# Patient Record
(demographics unavailable — no encounter records)

---

## 2024-10-24 NOTE — IMAGING
[Facet arthropathy] : Facet arthropathy [Disc space narrowing] : Disc space narrowing [No spinal deformity, fracture, lytic lesion, or marked single level collapse] : No spinal deformity, fracture, lytic lesion, or marked single level collapse [No instability seen on flexion/extension] : No instability seen on flexion/extension [de-identified] :  C Spine Inspection: No defects or deformities Palpation: TTP over C7 spinous process & trapezial and rhomboid musculature ROM: diminished in all planes, pain with extension.  Strength: 5/5 b/l deltoid, biceps, triceps, wrist flexors, wrist extensors, abductors Neuro: Sensation I LT Negative Puentes's b/l Negative Spurling

## 2024-10-24 NOTE — ASSESSMENT
[FreeTextEntry1] : the c45 disc space seems almost distracted;  there is an osteophyte that was probably prior to the injury in contact with the other side of the disc space;  I think we'll find a distraction type in jury at C45 on the MRI;    81 y/o M with acute on chronic axial neck pain s/p fall on 9/14. XR: diffuse spondylosis, no clear/obvious fx. On exam TTP over C7 spinous process, ROM diminished, pain with extension, no weakness. in UE.   -

## 2024-10-24 NOTE — HISTORY OF PRESENT ILLNESS
[Neck] : neck [de-identified] : 10/24/24: 81 y/o M presenting for evaluation of neck pain that started on 9/14 after falling off bed. Pain in midline of neck and into traps, denies radicular complaints. Had neck issues in the past, did PT, helped temporarily; unable to receive TK's d/t severity of stenosis. Has C Spine MRI 2017. Pain has been worsening since fall, saw PCP and was referred here. Denies balance/gait issues, no dexterity/fine motors problems. Reports had RT shoulder surgery at age 16, since has been having some diffuse weakness in RUE.

## 2024-11-07 NOTE — DATA REVIEWED
[MRI] : MRI [Cervical Spine] : cervical spine [Report was reviewed and noted in the chart] : The report was reviewed and noted in the chart [FreeTextEntry1] : [facility] C Spine MRI 10/25/24 1. Diffuse spondylosis w/o significant foraminal stenosis. 2. Increased lesion in the R lobe of the thyroid.

## 2024-11-07 NOTE — IMAGING
[Facet arthropathy] : Facet arthropathy [Disc space narrowing] : Disc space narrowing [No spinal deformity, fracture, lytic lesion, or marked single level collapse] : No spinal deformity, fracture, lytic lesion, or marked single level collapse [No instability seen on flexion/extension] : No instability seen on flexion/extension [de-identified] :  C Spine Inspection: No defects or deformities Palpation: TTP over C7 spinous process & trapezial and rhomboid musculature ROM: diminished in all planes, pain with extension.  Strength: 5/5 b/l deltoid, biceps, triceps, wrist flexors, wrist extensors, abductors Neuro: Sensation I LT Negative Puentes's b/l Negative Spurling

## 2024-11-07 NOTE — HISTORY OF PRESENT ILLNESS
[Neck] : neck [de-identified] : 11/7/24: Follow up C Spine. MRI review.   10/24/24: 79 y/o M presenting for evaluation of neck pain that started on 9/14 after falling off bed. Pain in midline of neck and into traps, denies radicular complaints. Had neck issues in the past, did PT, helped temporarily; unable to receive TK's d/t severity of stenosis. Has C Spine MRI 2017. Pain has been worsening since fall, saw PCP and was referred here. Denies balance/gait issues, no dexterity/fine motors problems. Reports had RT shoulder surgery at age 16, since has been having some diffuse weakness in RUE.

## 2024-11-07 NOTE — ASSESSMENT
[FreeTextEntry1] : 81 y/o M with acute on chronic axial neck pain s/p fall on 9/14. MRI: diffuse spondylosis, no significant foraminal stenosis.   - Referred to pain mgmt  - F/up as sxs dictate.

## 2024-11-20 NOTE — HISTORY OF PRESENT ILLNESS
[FreeTextEntry1] : 80 year M with PMHx significant for presents for initial evaluation regarding their neck pain.   Current treatment:   Location: Quality: Exacerbating Factors: Alleviating Factors: Numbness/tingling: Weakness:   Bowel/bladder dysfunction: Denies   Prior injections:   Prior Treatments:   Pertinent Surgical History:   Anticoagulation:     Patient denies current infection, fevers, or chills. Physician Disclaimer: I have personally reviewed and confirmed all HPI data with the patient.

## 2024-11-20 NOTE — HISTORY OF PRESENT ILLNESS
[FreeTextEntry1] : 80 year M with PMHx significant for presents for initial evaluation regarding their neck pain.   Current treatment:   Location: Quality: Exacerbating Factors: Alleviating Factors: Numbness/tingling: Weakness:   Bowel/bladder dysfunction: Denies   Prior injections:   Prior Treatments:   Pertinent Surgical History:   Anticoagulation:     Patient denies current infection, fevers, or chills. Physician Disclaimer: I have personally reviewed and confirmed all HPI data with the patient.                                    Alert-The patient is alert, awake and responds to voice. The patient is oriented to time, place, and person. The triage nurse is able to obtain subjective information.

## 2024-11-20 NOTE — ASSESSMENT
[FreeTextEntry1] : A discussion regarding available pain management treatment options occurred with the patient.  These included interventional, rehabilitative, pharmacological, and alternative modalities. We will proceed with the following:   Interventional treatment options: - Proceed with _ with fluoroscopic guidance - If inadequate relief would likely consider _ - Hold _ for _ days after obtaining appropriate medical clearance prior to planned injection - None indicated at present time  - see additional instructions below      Rehabilitative options: - initiate trial of physical therapy - continue physical therapy - participation in active HEP was discussed and instructions provided   Medication based treatment options: - initiate trial of _ - continue _   Complementary treatment options: - Weight management and lifestyle modifications discussed    Additional treatment recommendations as follows: - patient to follow up with_

## 2024-11-20 NOTE — PHYSICAL EXAM
[de-identified] : Constitutional:  - No acute distress  - Well developed; well nourished    Neurological:  - normal mood and affect  - alert and oriented x 3    Cardiovascular:  - grossly normal  General: Appears well nourished and well developed, no acute distress Musculoskeletal: Gait is non-antalgic, patient is ambulating without assistance Cervical Spine Exam:                      Inspection:    erythema (-)   ecchymosis (-)    rashes (-)                         Palpation: Cervical paraspinal tenderness: R (-); L(-)  Upper trapezius tenderness: R (-); L (-)  Rhomboids tenderness: R (-); L (-)                      ROM:     Full range of motion with mild stiffness                          Strength Testin/5 in the bilateral upper extremities                      Reflexes: 2/2 in the bilateral upper extremities                      Sensation: Sensation to light touch grossly intact in the bilateral upper extremities                      Special Tests:  Spurling Test: R (-); L (-)  Facet load test: R (-); L (-), Hoffmans : R (-), L (-)

## 2024-11-20 NOTE — DATA REVIEWED
[FreeTextEntry1] :  MRI images personally reviewed and reviewed with patient. Cervical Spine MRI OC (10/25/2024) 1. Multilevel degenerative disc and joint disease, most marked at C5-C6 and C6-C7 with likely impingement of bilateral exiting C6 and C7 nerve roots. 2. Interval increased 3.7 x 5.3 x 10.3 cm (AP x T x CC) lesion in the right lobe of the thyroid, which appears hypointense on T1 weighted sequence and heterogenous predominantly T2 hyperintense on T2 weighted sequence. Recommend further evaluation with sonography as clinically indicated.

## 2024-11-20 NOTE — PHYSICAL EXAM
[de-identified] : Constitutional:  - No acute distress  - Well developed; well nourished    Neurological:  - normal mood and affect  - alert and oriented x 3    Cardiovascular:  - grossly normal  General: Appears well nourished and well developed, no acute distress Musculoskeletal: Gait is non-antalgic, patient is ambulating without assistance Cervical Spine Exam:                      Inspection:    erythema (-)   ecchymosis (-)    rashes (-)                         Palpation: Cervical paraspinal tenderness: R (-); L(-)  Upper trapezius tenderness: R (-); L (-)  Rhomboids tenderness: R (-); L (-)                      ROM:     Full range of motion with mild stiffness                          Strength Testin/5 in the bilateral upper extremities                      Reflexes: 2/2 in the bilateral upper extremities                      Sensation: Sensation to light touch grossly intact in the bilateral upper extremities                      Special Tests:  Spurling Test: R (-); L (-)  Facet load test: R (-); L (-), Hoffmans : R (-), L (-)

## 2024-11-20 NOTE — PHYSICAL EXAM
[de-identified] : Constitutional:  - No acute distress  - Well developed; well nourished    Neurological:  - normal mood and affect  - alert and oriented x 3    Cardiovascular:  - grossly normal  General: Appears well nourished and well developed, no acute distress Musculoskeletal: Gait is non-antalgic, patient is ambulating without assistance Cervical Spine Exam:                      Inspection:    erythema (-)   ecchymosis (-)    rashes (-)                         Palpation: Cervical paraspinal tenderness: R (-); L(-)  Upper trapezius tenderness: R (-); L (-)  Rhomboids tenderness: R (-); L (-)                      ROM:     Full range of motion with mild stiffness                          Strength Testin/5 in the bilateral upper extremities                      Reflexes: 2/2 in the bilateral upper extremities                      Sensation: Sensation to light touch grossly intact in the bilateral upper extremities                      Special Tests:  Spurling Test: R (-); L (-)  Facet load test: R (-); L (-), Hoffmans : R (-), L (-)

## 2024-12-16 NOTE — PROCEDURE
[FreeTextEntry3] : Date of Service: 12/12/2024     Account: [id number]    Patient: CIRILO PARRY     YOB: 1944    Age: 80 year    Surgeon:      Walter Enriquez DO    Assistant:    None    Pre-Operative Diagnosis:         Spondylosis of Cervical Region without Myelopathy or Radiculopathy        Post Operative Diagnosis:       Spondylosis of Cervical Region without Myelopathy or Radiculopathy        Procedure:             Right C3,4,5,6 MBB under fluoroscopic guidance    Anesthesia:    None   Procedure Details: Patient was seen in the preoperative area and a detailed discussion about the risks, benefits and alternatives was carried out.  All related questions were satisfactorily answered and an informed consent was signed.  Procedure site was marked and patient was taken to the fluoroscopy suite and placed in the prone position on the procedure table.  A spine positioning device was utilized for adequate position.  Monitors were applied and vital signs were recorded.  Anesthesia was carried out as mentioned above. A timeout was performed with all essential staff present and the site and side were verified.     Posterior aspect of neck and upper back were prepped with Chloraprep  in a circumferential manner and draped in sterile aseptic fashion. The cervical spine was examined with fluoroscopy. AP and lateral views were used throughout the procedure. Lateral fluoroscopic images of cervical spine were obtained, and the lateral mass of C2 was identified. Parallax was eliminated with lateral rotation and cephalad or caudad angulation of the x-ray beam and cervical vertebral bodies were marked. The fluoroscopy beam was turned AP with slight caudad orientation. The skin entry sites were confirmed and marked on the skin. Each site was then anesthetized with 0.25 ml of 1% lidocaine  using a 25 gauge needle.    C3-C4 facet joint: C3 and C4 medial branches C4-C5 facet joint: C4 and C5 medial branches C5-C6 facet joint: C5 and C6 medial branches  The target areas for respective medial branches/dorsal ramus are:  Right C3 medial branch: Centroid of the articular pillar of left C3 vertebral body Right C4 medial branch: Centroid of the articular pillar of left C4 vertebral body Right C5 medial branch: Centroid of the articular pillar of left C5 vertebral body Right C6 medial branch: Centroid of the articular pillar of left C6 vertebral body  Using a gun barrel technique and intermittent fluoroscopic views, 25G spinal needles were placed at each respective level. A firm endpoint could be appreciated as the needle brushed past the bone. Correct needle position confirmed with multiple fluoroscopy views. At each level, 0.3-0.4 ml of 0.5% Bupivacaine  was injected at each level slowly. All needles were removed. Patient tolerated procedure well.  Vital signs remained normal throughout the procedure. There were no immediate complications from the performed procedure. Band aid(s) applied to injection site(s).  The patient was instructed to apply ice over the injection sites for twenty minutes every two hours for the next 24 hours.    Disposition:       1. The patient was advised to F/U in 1-2 weeks to assess the response to the injection.        2. They were advised to keep a pain diary to report the results of the diagnostic block at their FUV.       3. The patient was also instructed to contact me immediately if there were any concerns related to the procedure performed.

## 2024-12-16 NOTE — PROCEDURE
[FreeTextEntry3] : Date of Service: 12/12/2024     Account: [id number]    Patient: CIRILO PARRY     YOB: 1944    Age: 80 year    Surgeon:      Walter Enriquez DO    Assistant:    None    Pre-Operative Diagnosis:         Spondylosis of Cervical Region without Myelopathy or Radiculopathy        Post Operative Diagnosis:       Spondylosis of Cervical Region without Myelopathy or Radiculopathy        Procedure:             Right C3,4,5,6 MBB under fluoroscopic guidance    Anesthesia:    None   Procedure Details: Patient was seen in the preoperative area and a detailed discussion about the risks, benefits and alternatives was carried out.  All related questions were satisfactorily answered and an informed consent was signed.  Procedure site was marked and patient was taken to the fluoroscopy suite and placed in the prone position on the procedure table.  A spine positioning device was utilized for adequate position.  Monitors were applied and vital signs were recorded.  Anesthesia was carried out as mentioned above. A timeout was performed with all essential staff present and the site and side were verified.     Posterior aspect of neck and upper back were prepped with Chloraprep  in a circumferential manner and draped in sterile aseptic fashion. The cervical spine was examined with fluoroscopy. AP and lateral views were used throughout the procedure. Lateral fluoroscopic images of cervical spine were obtained, and the lateral mass of C2 was identified. Parallax was eliminated with lateral rotation and cephalad or caudad angulation of the x-ray beam and cervical vertebral bodies were marked. The fluoroscopy beam was turned AP with slight caudad orientation. The skin entry sites were confirmed and marked on the skin. Each site was then anesthetized with 0.25 ml of 1% lidocaine  using a 25 gauge needle.    C3-C4 facet joint: C3 and C4 medial branches C4-C5 facet joint: C4 and C5 medial branches C5-C6 facet joint: C5 and C6 medial branches  The target areas for respective medial branches/dorsal ramus are:  Right C3 medial branch: Centroid of the articular pillar of left C3 vertebral body Right C4 medial branch: Centroid of the articular pillar of left C4 vertebral body Right C5 medial branch: Centroid of the articular pillar of left C5 vertebral body Right C6 medial branch: Centroid of the articular pillar of left C6 vertebral body  Using a gun barrel technique and intermittent fluoroscopic views, 25G spinal needles were placed at each respective level. A firm endpoint could be appreciated as the needle brushed past the bone. Correct needle position confirmed with multiple fluoroscopy views. At each level, 0.3-0.4 ml of 0.5% Bupivacaine  was injected at each level slowly. All needles were removed. Patient tolerated procedure well.  Vital signs remained normal throughout the procedure. There were no immediate complications from the performed procedure. Band aid(s) applied to injection site(s).  The patient was instructed to apply ice over the injection sites for twenty minutes every two hours for the next 24 hours.    Disposition:       1. The patient was advised to F/U in 1-2 weeks to assess the response to the injection.        2. They were advised to keep a pain diary to report the results of the diagnostic block at their FUV.       3. The patient was also instructed to contact me immediately if there were any concerns related to the procedure performed.

## 2025-01-13 NOTE — HISTORY OF PRESENT ILLNESS
[Neck] : neck [Gradual] : gradual [7] : 7 [6] : 6 [Tightness] : tightness [Household chores] : household chores [Sleep] : sleep [Rest] : rest [Meds] : meds [Heat] : heat [] : no [FreeTextEntry1] : RT SHOULDER BLADE  [FreeTextEntry3] : 09/2024 [FreeTextEntry5] : BUMP TO A DRESSER BUMP HIMS SELF AND HAD A FALL  [FreeTextEntry6] : NUMBNESS  [FreeTextEntry7] : RT SHOULDER BLADE  [de-identified] : LOOKING UP  [de-identified] : MAN PEREZ [de-identified] : 10/25/95625 MRI CSPINE

## 2025-01-13 NOTE — ASSESSMENT
[FreeTextEntry1] : repeat mbbs 80 year M with PMHx significant for CAD s/p CABG presents with pain in the R>L neck  A discussion regarding available pain management treatment options occurred with the patient.  These included interventional, rehabilitative, pharmacological, and alternative modalities. We will proceed with the following:   Interventional treatment options: - Proceed with R C3,4,5,6 with fluoroscopic guidance, RFA if successful - If inadequate relief would likely consider OLIVER - see additional instructions below      Rehabilitative options: - participation in active HEP was discussed and instructions provided   Medication based treatment options: - continue Tylenol PRN   Complementary treatment options: - Weight management and lifestyle modifications discussed    Additional treatment recommendations as follows: - patient to follow up with Dr. Mitchell - patient to follow up with PCP about thryoid lesion  Patient is presenting with acute/sub-acute pain with impairment in ADLs and functionality. The pain has not responded to conservative care including NSAID therapy, OTC analgesics and/or physical therapy.  The risks, benefits and alternatives of the proposed procedure were explained in detail with the patient. The risks outlined include but are not limited to infection, bleeding, post- dural puncture headache, nerve injury, a temporary increase in pain, failure to resolve symptoms, need for future interventions, allergic reaction, and possible elevation of blood sugar in diabetics if using corticosteroid.  All questions were answered to patient's apparent satisfaction, and he/she verbalized an understanding.

## 2025-01-13 NOTE — PHYSICAL EXAM
[de-identified] : General: Appears well nourished and well developed, no acute distress Musculoskeletal: Gait is non-antalgic, patient is ambulating without assistance Cervical Spine Exam:                      Inspection:    erythema (-)   ecchymosis (-)    rashes (-)                         Palpation: Cervical paraspinal tenderness: R (-); L(-)  Upper trapezius tenderness: R (-); L (-)  Rhomboids tenderness: R (-); L (-)                      ROM:     Full range of motion with mild stiffness                          Strength Testin/5 in the bilateral upper extremities                      Reflexes: 1/2 in the bilateral upper extremities                      Sensation: Sensation to light touch grossly intact in the bilateral upper extremities                      Special Tests:  Spurling Test: R (-); L (-)  Facet load test: R (+); L (+), Hoffmans : R (-), L (-)

## 2025-01-13 NOTE — DATA REVIEWED
[FreeTextEntry1] :  MRI images personally reviewed and reviewed with patient. Cervical Spine MRI OC (10/25/2024) 1. Multilevel degenerative disc and joint disease, most marked at C5-C6 and C6-C7 with likely impingement of bilateral exiting C6 and C7 nerve roots. 2. Interval increased 3.7 x 5.3 x 10.3 cm (AP x T x CC) lesion in the right lobe of the thyroid, which appears hypointense on T1 weighted sequence and heterogenous predominantly T2 hyperintense on T2 weighted sequence. Recommend further evaluation with sonography as clinically indicated. (patient notified to follow up with PCP about this)

## 2025-01-15 NOTE — HISTORY OF PRESENT ILLNESS
[Neck] : neck [Gradual] : gradual [7] : 7 [Tightness] : tightness [Household chores] : household chores [Sleep] : sleep [Rest] : rest [Meds] : meds [Heat] : heat [6] : 6 [Radiating] : radiating [Intermittent] : intermittent [Retired] : Work status: retired [] : Patient is currently injured and not playing sports: no [FreeTextEntry1] : RT SHOULDER BLADE  [FreeTextEntry3] : 09/2024 [FreeTextEntry5] : BUMP TO A DRESSER BUMP HIMS SELF AND HAD A FALL  [FreeTextEntry6] : NUMBNESS ,stiffnes  [FreeTextEntry7] : RT SHOULDER BLADE  [de-identified] : LOOKING UP ,turning head  [de-identified] : MAN PEREZ [de-identified] : 10/25/16736 MRI CSPINE

## 2025-01-15 NOTE — ASSESSMENT
[FreeTextEntry1] : 80 year M with PMHx significant for CAD s/p CABG presents with pain in the R>L neck  Cervical spondylosis, >80% relief with first set. Will pursue second set.  A discussion regarding available pain management treatment options occurred with the patient.  These included interventional, rehabilitative, pharmacological, and alternative modalities. We will proceed with the following:   Interventional treatment options: - Proceed with 2nd R C3,4,5,6 with fluoroscopic guidance, RFA if successful - If inadequate relief would likely consider OLIVER - see additional instructions below      Rehabilitative options: - participation in active HEP was discussed and instructions provided   Medication based treatment options: - continue Tylenol PRN   Complementary treatment options: - Weight management and lifestyle modifications discussed    Additional treatment recommendations as follows: - patient to follow up with Dr. Mitchell - patient to follow up with PCP about thyroid lesion (patient aware and it is being monitored)  Patient is presenting with acute/sub-acute pain with impairment in ADLs and functionality. The pain has not responded to conservative care including NSAID therapy, OTC analgesics and/or physical therapy.  The risks, benefits and alternatives of the proposed procedure were explained in detail with the patient. The risks outlined include but are not limited to infection, bleeding, post- dural puncture headache, nerve injury, a temporary increase in pain, failure to resolve symptoms, need for future interventions, allergic reaction, and possible elevation of blood sugar in diabetics if using corticosteroid.  All questions were answered to patient's apparent satisfaction, and he/she verbalized an understanding.

## 2025-01-15 NOTE — HISTORY OF PRESENT ILLNESS
[Neck] : neck [Gradual] : gradual [7] : 7 [Tightness] : tightness [Household chores] : household chores [Sleep] : sleep [Rest] : rest [Meds] : meds [Heat] : heat [6] : 6 [Radiating] : radiating [Intermittent] : intermittent [Retired] : Work status: retired [] : Patient is currently injured and not playing sports: no [FreeTextEntry1] : RT SHOULDER BLADE  [FreeTextEntry3] : 09/2024 [FreeTextEntry5] : BUMP TO A DRESSER BUMP HIMS SELF AND HAD A FALL  [FreeTextEntry6] : NUMBNESS ,stiffnes  [FreeTextEntry7] : RT SHOULDER BLADE  [de-identified] : LOOKING UP ,turning head  [de-identified] : MAN PEREZ [de-identified] : 10/25/57529 MRI CSPINE

## 2025-01-15 NOTE — PHYSICAL EXAM
[de-identified] : General: Appears well nourished and well developed, no acute distress Musculoskeletal: Gait is non-antalgic, patient is ambulating without assistance Cervical Spine Exam:                      Inspection:    erythema (-)   ecchymosis (-)    rashes (-)                         Palpation: Cervical paraspinal tenderness: R (-); L(-)  Upper trapezius tenderness: R (-); L (-)  Rhomboids tenderness: R (-); L (-)                      ROM:     Full range of motion with mild stiffness                          Strength Testin/5 in the bilateral upper extremities                      Reflexes: 1/2 in the bilateral upper extremities                      Sensation: Sensation to light touch grossly intact in the bilateral upper extremities                      Special Tests:  Spurling Test: R (-); L (-)  Facet load test: R (+); L (+), Hoffmans : R (-), L (-)

## 2025-01-15 NOTE — PHYSICAL EXAM
[de-identified] : General: Appears well nourished and well developed, no acute distress Musculoskeletal: Gait is non-antalgic, patient is ambulating without assistance Cervical Spine Exam:                      Inspection:    erythema (-)   ecchymosis (-)    rashes (-)                         Palpation: Cervical paraspinal tenderness: R (-); L(-)  Upper trapezius tenderness: R (-); L (-)  Rhomboids tenderness: R (-); L (-)                      ROM:     Full range of motion with mild stiffness                          Strength Testin/5 in the bilateral upper extremities                      Reflexes: 1/2 in the bilateral upper extremities                      Sensation: Sensation to light touch grossly intact in the bilateral upper extremities                      Special Tests:  Spurling Test: R (-); L (-)  Facet load test: R (+); L (+), Hoffmans : R (-), L (-)

## 2025-03-05 NOTE — PROCEDURE
[FreeTextEntry3] : Date of Service: 03/05/2025     Account: [id number]    Patient: CIRILO PARRY     YOB: 1944    Age: 80 year    Surgeon:      Walter Enriquez DO    Assistant:    None    Pre-Operative Diagnosis:         Spondylosis of Cervical Region without Myelopathy or Radiculopathy        Post Operative Diagnosis:       Spondylosis of Cervical Region without Myelopathy or Radiculopathy        Procedure:             C3-4, C4-5, C5-6, facet block under fluoroscopic guidance    Anesthesia:  MAC     Procedure Details: Patient was seen in the preoperative area and a detailed discussion about the risks, benefits and alternatives was carried out.  All related questions were satisfactorily answered and an informed consent was signed.  Procedure site was marked and patient was taken to the fluoroscopy suite and placed in the prone position on the procedure table.  A spine positioning device was utilized for adequate position.  Monitors were applied and vital signs were recorded.  Anesthesia was carried out as mentioned above. A timeout was performed with all essential staff present and the site and side were verified.     Posterior aspect of neck and upper back were prepped with Chloraprep  in a circumferential manner and draped in sterile aseptic fashion. The cervical spine was examined with fluoroscopy. AP and lateral views were used throughout the procedure. Lateral fluoroscopic images of cervical spine were obtained, and the lateral mass of C2 was identified. Parallax was eliminated with lateral rotation and cephalad or caudad angulation of the x-ray beam and cervical vertebral bodies were marked. The fluoroscopy beam was turned AP with slight caudad orientation. The skin entry sites were confirmed and marked on the skin. Each site was then anesthetized with 0.25 ml of 1% lidocaine  using a 25 gauge needle.    C3-C4 facet joint: C3 and C4 medial branches C4-C5 facet joint: C4 and C5 medial branches C5-C6 facet joint: C5 and C6 medial branches  The target areas for respective medial branches/dorsal ramus are:  Right C3 medial branch: Centroid of the articular pillar of left C3 vertebral body Right C4 medial branch: Centroid of the articular pillar of left C4 vertebral body Right C5 medial branch: Centroid of the articular pillar of left C5 vertebral body Right C6 medial branch: Centroid of the articular pillar of left C6 vertebral body  Using a gun barrel technique and intermittent fluoroscopic views, 25G spinal needles were placed at each respective level. A firm endpoint could be appreciated as the needle brushed past the bone. Correct needle position confirmed with multiple fluoroscopy views. At each level, 0.3-0.4 ml of 0.5% Bupivacaine was injected at each level slowly. All needles were removed. Patient tolerated procedure well.  Vital signs remained normal throughout the procedure. There were no immediate complications from the performed procedure. Band aid(s) applied to injection site(s).  The patient was instructed to apply ice over the injection sites for twenty minutes every two hours for the next 24 hours.    Disposition:       1. The patient was advised to F/U in 1-2 weeks to assess the response to the injection.        2. They were advised to keep a pain diary to report the results of the diagnostic block at their [facility].       3. The patient was also instructed to contact me immediately if there were any concerns related to the procedure performed.

## 2025-03-28 NOTE — PHYSICAL EXAM
[de-identified] : General: Appears well nourished and well developed, no acute distress Musculoskeletal: Gait is non-antalgic, patient is ambulating without assistance Cervical Spine Exam:                      Inspection:    erythema (-)   ecchymosis (-)    rashes (-)                         Palpation: Cervical paraspinal tenderness: R (-); L(-)  Upper trapezius tenderness: R (-); L (-)  Rhomboids tenderness: R (-); L (-)                      ROM:     Full range of motion with mild stiffness                          Strength Testin/5 in the bilateral upper extremities                      Reflexes: 1/2 in the bilateral upper extremities                      Sensation: Sensation to light touch grossly intact in the bilateral upper extremities                      Special Tests:  Spurling Test: R (-); L (-)  Facet load test: R (+); L (+), Hoffmans : R (-), L (-)

## 2025-03-28 NOTE — ASSESSMENT
[FreeTextEntry1] : RFA?  80 year M with PMHx significant for CAD s/p CABG presents with pain in the R>L neck  Cervical spondylosis, >80% relief with first set. Will pursue second set.  A discussion regarding available pain management treatment options occurred with the patient.  These included interventional, rehabilitative, pharmacological, and alternative modalities. We will proceed with the following:   Interventional treatment options: - Proceed with 2nd R C3,4,5,6 with fluoroscopic guidance, RFA if successful - If inadequate relief would likely consider OLIVER - see additional instructions below      Rehabilitative options: - participation in active HEP was discussed and instructions provided   Medication based treatment options: - continue Tylenol PRN   Complementary treatment options: - Weight management and lifestyle modifications discussed    Additional treatment recommendations as follows: - patient to follow up with Dr. Mitchell - patient to follow up with PCP about thyroid lesion (patient aware and it is being monitored)  Patient is presenting with acute/sub-acute pain with impairment in ADLs and functionality. The pain has not responded to conservative care including NSAID therapy, OTC analgesics and/or physical therapy.  The risks, benefits and alternatives of the proposed procedure were explained in detail with the patient. The risks outlined include but are not limited to infection, bleeding, post- dural puncture headache, nerve injury, a temporary increase in pain, failure to resolve symptoms, need for future interventions, allergic reaction, and possible elevation of blood sugar in diabetics if using corticosteroid.  All questions were answered to patient's apparent satisfaction, and he/she verbalized an understanding.

## 2025-03-28 NOTE — HISTORY OF PRESENT ILLNESS
[Neck] : neck [Gradual] : gradual [6] : 6 [Radiating] : radiating [Tightness] : tightness [Intermittent] : intermittent [Household chores] : household chores [Sleep] : sleep [Rest] : rest [Meds] : meds [Heat] : heat [Retired] : Work status: retired [] : Patient is currently injured and not playing sports: no [FreeTextEntry1] : RT SHOULDER BLADE  [FreeTextEntry3] : 09/2024 [FreeTextEntry5] : BUMP TO A DRESSER BUMP HIMS SELF AND HAD A FALL  [FreeTextEntry6] : NUMBNESS ,stiffnes  [FreeTextEntry7] : RT SHOULDER BLADE  [de-identified] : LOOKING UP ,turning head  [de-identified] : MAN PEREZ [de-identified] : 10/25/79087 MRI CSPINE

## 2025-05-02 NOTE — PHYSICAL EXAM
[de-identified] : General: Appears well nourished and well developed, no acute distress Musculoskeletal: Gait is non-antalgic, patient is ambulating without assistance Cervical Spine Exam:                      Inspection:    erythema (-)   ecchymosis (-)    rashes (-)                         Palpation: Cervical paraspinal tenderness: R (-); L(-)  Upper trapezius tenderness: R (-); L (-)  Rhomboids tenderness: R (-); L (-)                      ROM:     Full range of motion with mild stiffness                          Strength Testin/5 in the bilateral upper extremities                      Reflexes: 1/2 in the bilateral upper extremities                      Sensation: Sensation to light touch grossly intact in the bilateral upper extremities                      Special Tests:  Spurling Test: R (-); L (-)  Facet load test: R (+); L (+), Hoffmans : R (-), L (-)

## 2025-05-02 NOTE — HISTORY OF PRESENT ILLNESS
[Neck] : neck [Gradual] : gradual [6] : 6 [Radiating] : radiating [Tightness] : tightness [Intermittent] : intermittent [Household chores] : household chores [Sleep] : sleep [Rest] : rest [Meds] : meds [Heat] : heat [Retired] : Work status: retired [] : Patient is currently injured and not playing sports: no [FreeTextEntry1] : RT SHOULDER BLADE  [FreeTextEntry3] : 09/2024 [FreeTextEntry5] : BUMP TO A DRESSER BUMP HIMS SELF AND HAD A FALL  [FreeTextEntry6] : NUMBNESS ,stiffnes  [FreeTextEntry7] : RT SHOULDER BLADE  [de-identified] : LOOKING UP ,turning head  [de-identified] : MAN PEREZ [de-identified] : 10/25/30525 MRI CSPINE

## 2025-05-02 NOTE — PHYSICAL EXAM
[de-identified] : General: Appears well nourished and well developed, no acute distress Musculoskeletal: Gait is non-antalgic, patient is ambulating without assistance Cervical Spine Exam:                      Inspection:    erythema (-)   ecchymosis (-)    rashes (-)                         Palpation: Cervical paraspinal tenderness: R (-); L(-)  Upper trapezius tenderness: R (-); L (-)  Rhomboids tenderness: R (-); L (-)                      ROM:     Full range of motion with mild stiffness                          Strength Testin/5 in the bilateral upper extremities                      Reflexes: 1/2 in the bilateral upper extremities                      Sensation: Sensation to light touch grossly intact in the bilateral upper extremities                      Special Tests:  Spurling Test: R (-); L (-)  Facet load test: R (+); L (+), Hoffmans : R (-), L (-)

## 2025-05-02 NOTE — HISTORY OF PRESENT ILLNESS
[Neck] : neck [Gradual] : gradual [6] : 6 [Radiating] : radiating [Tightness] : tightness [Intermittent] : intermittent [Household chores] : household chores [Sleep] : sleep [Rest] : rest [Meds] : meds [Heat] : heat [Retired] : Work status: retired [] : Patient is currently injured and not playing sports: no [FreeTextEntry1] : RT SHOULDER BLADE  [FreeTextEntry3] : 09/2024 [FreeTextEntry5] : BUMP TO A DRESSER BUMP HIMS SELF AND HAD A FALL  [FreeTextEntry6] : NUMBNESS ,stiffnes  [FreeTextEntry7] : RT SHOULDER BLADE  [de-identified] : LOOKING UP ,turning head  [de-identified] : MAN PEREZ [de-identified] : 10/25/64372 MRI CSPINE

## 2025-05-02 NOTE — PHYSICAL EXAM
[de-identified] : General: Appears well nourished and well developed, no acute distress Musculoskeletal: Gait is non-antalgic, patient is ambulating without assistance Cervical Spine Exam:                      Inspection:    erythema (-)   ecchymosis (-)    rashes (-)                         Palpation: Cervical paraspinal tenderness: R (-); L(-)  Upper trapezius tenderness: R (-); L (-)  Rhomboids tenderness: R (-); L (-)                      ROM:     Full range of motion with mild stiffness                          Strength Testin/5 in the bilateral upper extremities                      Reflexes: 1/2 in the bilateral upper extremities                      Sensation: Sensation to light touch grossly intact in the bilateral upper extremities                      Special Tests:  Spurling Test: R (-); L (-)  Facet load test: R (+); L (+), Hoffmans : R (-), L (-)

## 2025-05-02 NOTE — HISTORY OF PRESENT ILLNESS
[Neck] : neck [Gradual] : gradual [6] : 6 [Radiating] : radiating [Tightness] : tightness [Intermittent] : intermittent [Household chores] : household chores [Sleep] : sleep [Rest] : rest [Meds] : meds [Heat] : heat [Retired] : Work status: retired [] : Patient is currently injured and not playing sports: no [FreeTextEntry1] : RT SHOULDER BLADE  [FreeTextEntry3] : 09/2024 [FreeTextEntry5] : BUMP TO A DRESSER BUMP HIMS SELF AND HAD A FALL  [FreeTextEntry6] : NUMBNESS ,stiffnes  [FreeTextEntry7] : RT SHOULDER BLADE  [de-identified] : LOOKING UP ,turning head  [de-identified] : MAN PEREZ [de-identified] : 10/25/51062 MRI CSPINE  No